# Patient Record
Sex: FEMALE | Race: WHITE | NOT HISPANIC OR LATINO | URBAN - METROPOLITAN AREA
[De-identification: names, ages, dates, MRNs, and addresses within clinical notes are randomized per-mention and may not be internally consistent; named-entity substitution may affect disease eponyms.]

---

## 2017-06-05 ENCOUNTER — EMERGENCY (EMERGENCY)
Facility: HOSPITAL | Age: 42
LOS: 1 days | Discharge: HOME | End: 2017-06-05
Attending: EMERGENCY MEDICINE | Admitting: EMERGENCY MEDICINE
Payer: COMMERCIAL

## 2017-06-05 VITALS
OXYGEN SATURATION: 100 % | RESPIRATION RATE: 16 BRPM | TEMPERATURE: 99 F | HEART RATE: 74 BPM | DIASTOLIC BLOOD PRESSURE: 80 MMHG | SYSTOLIC BLOOD PRESSURE: 128 MMHG

## 2017-06-05 PROBLEM — Z00.00 ENCOUNTER FOR PREVENTIVE HEALTH EXAMINATION: Status: ACTIVE | Noted: 2017-06-05

## 2017-06-05 PROCEDURE — 99283 EMERGENCY DEPT VISIT LOW MDM: CPT

## 2017-06-05 RX ORDER — FAMCICLOVIR 500 MG/1
1 TABLET, FILM COATED ORAL
Qty: 42 | Refills: 0 | OUTPATIENT
Start: 2017-06-05 | End: 2017-06-19

## 2017-06-05 NOTE — ED PROVIDER NOTE - PHYSICAL EXAMINATION
Gen: NAD, AOx3, non-toxic // Head: NCAT // HEENT: EOMI, conjuntival injection on the L , L side inferior eyelid redness and swelling, oral mucosa moist, // Lung: CTAB, no respiratory distress, no wheezes/rhonchi/rales B/L, speaking in full sentences. // CV: RRR, no murmurs, rubs or gallops // Abd: soft, NTND, no guarding // MSK: no visible deformities // Neuro: No focal sensory or motor deficits // Skin: Warm, well perfused, small macular papular rash on bilateral nasal bridge extending to inferior L eye without blisters or exudates.  // Psych: normal affect Gen: NAD, AOx3, non-toxic // Head: NCAT // HEENT: EOMI, conjuntival injection on the L , L side inferior eyelid redness and swelling, oral mucosa moist, No tenderness to percussion of teeth. Palpable lymph node <1 cm in left anterior cervical  // Lung: CTAB, no respiratory distress, no wheezes/rhonchi/rales B/L, speaking in full sentences. // CV: RRR, no murmurs, rubs or gallops // Abd: soft, NTND, no guarding // MSK: no visible deformities // Neuro: No focal sensory or motor deficits // Skin: Warm, well perfused, small macular papular rash on bilateral nasal bridge extending to inferior L eye without blisters or exudates.  // Psych: normal affect Gen: NAD, AOx3, non-toxic // Head: NCAT // HEENT: EOMI, conjuntival injection on the L , L side inferior eyelid redness and swelling, oral mucosa moist, No tenderness to percussion of teeth. Palpable lymph node <1 cm in left anterior cervical. Flourescin eye stain exam negative for increased uptake on cornea. Specifically no dendritic patterns or any increased uptake seen. // Lung: CTAB, no respiratory distress, no wheezes/rhonchi/rales B/L, speaking in full sentences. // CV: RRR, no murmurs, rubs or gallops // Abd: soft, NTND, no guarding // MSK: no visible deformities // Neuro: No focal sensory or motor deficits // Skin: Warm, well perfused, small macular papular rash on bilateral nasal bridge extending to inferior L eye without blisters or exudates.  // Psych: normal affect

## 2017-06-05 NOTE — ED PROVIDER NOTE - NS ED ROS FT
No fever or chills, no change in vision, no trouble swallowing or speaking, no chest pain, no cough or SOB, no abdominal pain, no nausea or no vomiting, no joint pain, +rashes, +slight headache,  otherwise as HPI or negative

## 2017-06-05 NOTE — ED PROVIDER NOTE - ATTENDING CONTRIBUTION TO CARE
This is a delayed note.  Patient presenting with nasal bridge rash and left lower lid irritaiton. Mild. Patient has seen urgent care x 2 with antibiotics of keflex/cipro eye drops and then clindamycin prescribed without much improvement. Patient has moderate pain and irritation to the nose and to the tip of the nose. No new creams/lotions/perfumes. Persistent. Not improving with therapies. No blurry vision, eye pain, or change in vision.  zoster like rash noted on pe to tip of nose, bridge of nose and left side of nasal bridge into the lower lid margin, no dendritic pattern with fluorescin staining, no rash to hands/palms or other mucosal areas, no corneal abrasions, mild distress secondary to discomfort, trachea midline, non-tachycardic, non-tachypneic, non-distended, no edema, no rash to extremities or trunk  will prescribe antivirals, patient to  follow up with dermatology in 2 days. Will encourage opthalmology  follow up  and/or immediate Emergency Department return if any eye irritation worsens or if there is any visual complaint.   No immediate life threatening issues present on history or clinical exam. Patient is a safe disposition home, has capacity and insight into their condition, ambulatory in the emergency department and will follow up with their doctor(s) this week. Patient  understands anticipatory guidance and was given strict return and follow up precautions. The patient has been informed of all concerning signs and symptoms to return to Emergency Department, the necessity to follow up with PMD/Clinic/follow up provided within 2-3 days was explained, and the patient reports understanding of above with capacity and insight.

## 2017-06-05 NOTE — ED PROVIDER NOTE - OBJECTIVE STATEMENT
42 F here for L sided eye swelling. Onset last Monday with a "pimple". rash started near eye, Thursday went to urgvent care got bacitracin and keflex, not better. Saturday returned to urgent care started on Ciloxien eye drops and clindamycin and not improved. Also had L "lymph node swelling and pain" (point at anterior cervical spine) and L sided upper molar pain. States molar is cracked, being followed by dentist. 42 F here for L sided eye swelling. Onset last Monday with a "pimple". rash started near eye, Thursday went to urgvent care got bacitracin and keflex, not better. Saturday returned to urgent care started on Ciloxien eye drops and clindamycin and not improved. Also had L "lymph node swelling and pain" (point at anterior cervical spine) and L sided upper molar pain. States molar is cracked, being followed by dentist. Denied hikes in woods, states was gardening before symptoms started. Denied photophobia. Mild headache but denied vertigo, N/V. 42 F here for L sided eye swelling. Onset last Monday with a "pimple". rash started near eye, Thursday went to urgent care got bacitracin and keflex, not better. Saturday returned to urgent care started on Ciloxien eye drops and clindamycin and not improved. Also had L "lymph node swelling and pain" (point at anterior cervical spine) and L sided upper molar pain. States molar is cracked, being followed by dentist. Denied hikes in woods, states was gardening before symptoms started. Denied photophobia. Mild headache but denied vertigo, N/V.

## 2017-06-05 NOTE — ED PROVIDER NOTE - MEDICAL DECISION MAKING DETAILS
42 F with rash, and left sided conjunctival redness. May be viral exanthem but doubt herpes zoster ophthalmacus given h/p. Doubt lyme disease given h/p. May be contact dermatitis given recent gardening before onset of symptoms. Will prescribe corticosteroid cream and eye drops, follow-up with ophthalmology/dermatology 42 F with rash, and left sided conjunctival redness. May be viral exanthem but no herpes zoster optical involvement, given h/p. Doubt lyme disease given h/p. May be contact dermatitis given recent gardening before onset of symptoms. Will prescribe corticosteroid cream for bridge of nose and acyclovir, follow-up with ophthalmology/dermatology

## 2017-06-05 NOTE — ED PROVIDER NOTE - CARE PLAN
Principal Discharge DX:	Herpes zoster without complication  Instructions for follow-up, activity and diet:	You were seen in the ER for red eye and rash. You must follow up with an ophthalmologist and dermatologist in 24 to 48 hours. The number for the ophthalmology clinic is [phone]. You must follow up with your primary physician in 24 to 48 hours. Return to the ER for any new or worsening signs/symptoms.   1) Take the Famcyclovir as prescribed.  Secondary Diagnosis:	Acute conjunctivitis of left eye, unspecified acute conjunctivitis type Principal Discharge DX:	Herpes zoster without complication  Instructions for follow-up, activity and diet:	You were seen in the ER for red eye and rash. You must follow up with an ophthalmologist and dermatologist in 24 to 48 hours. The number for the ophthalmology clinic is [phone]. You must follow up with your primary physician in 24 to 48 hours. Return to the ER for any new or worsening signs/symptoms.   1) Take the Famcyclovir as prescribed.  Secondary Diagnosis:	Acute conjunctivitis of left eye, unspecified acute conjunctivitis type Principal Discharge DX:	Herpes zoster without complication  Instructions for follow-up, activity and diet:	You were seen in the ER for red eye and rash. You must follow up with an ophthalmologist and dermatologist in 24 to 48 hours. The number for the ophthalmology clinic is [phone]. You must follow up with your primary physician in 24 to 48 hours. Return to the ER for any new or worsening signs/symptoms.   1) Take the Famcyclovir as prescribed.  Secondary Diagnosis:	Acute conjunctivitis of left eye, unspecified acute conjunctivitis type

## 2017-06-05 NOTE — ED PROVIDER NOTE - PLAN OF CARE
You were seen in the ER for red eye and rash. You must follow up with an ophthalmologist and dermatologist in 24 to 48 hours. The number for the ophthalmology clinic is [phone]. You must follow up with your primary physician in 24 to 48 hours. Return to the ER for any new or worsening signs/symptoms.   1) Take the Famcyclovir as prescribed.

## 2017-07-22 ENCOUNTER — TRANSCRIPTION ENCOUNTER (OUTPATIENT)
Age: 42
End: 2017-07-22

## 2022-12-14 ENCOUNTER — TRANSCRIPTION ENCOUNTER (OUTPATIENT)
Age: 47
End: 2022-12-14

## 2022-12-14 ENCOUNTER — APPOINTMENT (OUTPATIENT)
Dept: ULTRASOUND IMAGING | Facility: CLINIC | Age: 47
End: 2022-12-14

## 2022-12-14 PROCEDURE — 76830 TRANSVAGINAL US NON-OB: CPT

## 2022-12-14 PROCEDURE — 76856 US EXAM PELVIC COMPLETE: CPT
